# Patient Record
Sex: MALE | Race: WHITE | NOT HISPANIC OR LATINO
[De-identification: names, ages, dates, MRNs, and addresses within clinical notes are randomized per-mention and may not be internally consistent; named-entity substitution may affect disease eponyms.]

---

## 2020-07-08 PROBLEM — Z00.00 ENCOUNTER FOR PREVENTIVE HEALTH EXAMINATION: Status: ACTIVE | Noted: 2020-07-08

## 2020-07-09 ENCOUNTER — APPOINTMENT (OUTPATIENT)
Dept: OTOLARYNGOLOGY | Facility: CLINIC | Age: 25
End: 2020-07-09
Payer: COMMERCIAL

## 2020-07-09 VITALS
TEMPERATURE: 97.3 F | HEART RATE: 74 BPM | BODY MASS INDEX: 19.49 KG/M2 | SYSTOLIC BLOOD PRESSURE: 131 MMHG | DIASTOLIC BLOOD PRESSURE: 90 MMHG | WEIGHT: 117 LBS | HEIGHT: 65 IN

## 2020-07-09 DIAGNOSIS — S09.92XS UNSPECIFIED INJURY OF NOSE, SEQUELA: ICD-10-CM

## 2020-07-09 PROCEDURE — 31231 NASAL ENDOSCOPY DX: CPT

## 2020-07-09 PROCEDURE — 99204 OFFICE O/P NEW MOD 45 MIN: CPT | Mod: 25

## 2020-07-09 NOTE — ASSESSMENT
[FreeTextEntry1] : Mr. Anderson has chronic nasal congestion, anosmia and rhinorrhea x years.\par He has significant deviated nasal septum and nasal deformity, likely from prior nasal trauma.\par He has large bilateral nasal polyps emanating from the anterior and posterior sinus drainage pathways and prevent odors from getting to the nasal roof.\par He has chronic sinusitis and allergic rhinitis, seasonal and perennial.\par \par PLAN:\par - CT sinus noncontrast, scheduled for 7/14/2020\par - I recommended bilateral endoscopic sinus surgery (extent to be determined by the CT scan)with navigation, to be done at same operation for his septorhinoplasty.\par Risks, benefits and alternatives were discussed with patient and his mother.\par Risks include, but are not limited to, bleeding, infection, septal hematoma or abscess, septal perforation, intranasal scarring, difficulty breathing, cerebrospinal fluid leak, vision change. \par He is aware that surgery does not treat allergy and that polyps can return.\par Pt and mother's questions were answered, and he agreed to proceed.\par The sinonasal surgery has been scheduled for  July 22 at [facility]. \par Medical evaluation and clearance for surgery from Dr. Mendoza would be greatly appreciated.\par Preoperative oral steroids have been prescribed and will start 3 days prior to surgery.\par

## 2020-07-09 NOTE — CONSULT LETTER
[Dear  ___] : Dear  [unfilled], [( Thank you for referring [unfilled] for consultation for _____ )] : Thank you for referring [unfilled] for consultation for [unfilled] [Please see my note below.] : Please see my note below. [Consult Closing:] : Thank you very much for allowing me to participate in the care of this patient.  If you have any questions, please do not hesitate to contact me. [Sincerely,] : Sincerely, [DrJutsino  ___] : Dr. ROSE [FreeTextEntry2] : Nikunj Castillo MD\par [address] [facility], [address]\par [place] [address]\par  [FreeTextEntry3] : \par Shikha Vidal MD \par Otolaryngology, Head and Neck Surgery \par \par

## 2020-07-09 NOTE — HISTORY OF PRESENT ILLNESS
[de-identified] : Mr Anderson is a 24 year old man who was referred by Dr. Nikunj Castillo for nasal polyps.\par He was accompanied by his mother, who also contributed to history.  \par \par He is scheduled to have septorhinoplasty with Dr. Castillo on July 22 and is interested in sinus surgery also.\par Patient had a sports injury 8 years ago and broke his nose.\par Since the injury, he has nasal congestion, decreased sense of smell and rhinorrhea.  No facial pain or headache.\par Ears feel heavy and clogged.  No tinnitus or dizziness.\par Seasonal and dust allergies.   Denies asthma and ASA sensitivity.\par Previously treated another ENT with periodic prednisone, Claritin and Flonase for the symptoms with little relief.\par He denies recurrent acute sinus infections and sinus imaging.\par \par

## 2020-07-09 NOTE — PROCEDURE
[FreeTextEntry6] : \par Indication:  Anosmia\par -Verbal consent was obtained from patient prior to exam. \par - Carlos-Synephrine  spray applied to nose bilaterally.\par Nasal endoscopy was performed with flexible  scope.\par Findings: \par -- Inferior turbinates edematous and boggy  bilateral.  Inferior meatus clear bilateral.\par -- Septum was very  S-shaped.\par -- Large polyps see blocking most of nasal airway and emanating from middle meatus and SER bilateral.\par -- Mucus clear bilateral\par -- Middle and superior turbinates not really visible due to polyps.\par -- Nasopharynx without mass or exudate\par -- Adenoids were small\par -- Eustachian orifices were clear bilateral\par \par The patient tolerated the procedure well.\par

## 2020-07-09 NOTE — REVIEW OF SYSTEMS
[As Noted in HPI] : as noted in HPI [Negative] : Heme/Lymph [Patient Intake Form Reviewed] : Patient intake form was reviewed

## 2020-07-09 NOTE — CONSULT LETTER
[Dear  ___] : Dear  [unfilled], [( Thank you for referring [unfilled] for consultation for _____ )] : Thank you for referring [unfilled] for consultation for [unfilled] [Please see my note below.] : Please see my note below. [Consult Closing:] : Thank you very much for allowing me to participate in the care of this patient.  If you have any questions, please do not hesitate to contact me. [Sincerely,] : Sincerely, [DrJustino  ___] : Dr. ROSE [FreeTextEntry2] : Nikunj Castillo MD\par [address] [facility], [address]\par [place] [address]\par  [FreeTextEntry3] : \par Shikha Vidal MD \par Otolaryngology, Head and Neck Surgery \par \par

## 2020-07-09 NOTE — HISTORY OF PRESENT ILLNESS
[de-identified] : Mr Anderson is a 24 year old man who was referred by Dr. Nikunj Castillo for nasal polyps.\par He was accompanied by his mother, who also contributed to history.  \par \par He is scheduled to have septorhinoplasty with Dr. Castillo on July 22 and is interested in sinus surgery also.\par Patient had a sports injury 8 years ago and broke his nose.\par Since the injury, he has nasal congestion, decreased sense of smell and rhinorrhea.  No facial pain or headache.\par Ears feel heavy and clogged.  No tinnitus or dizziness.\par Seasonal and dust allergies.   Denies asthma and ASA sensitivity.\par Previously treated another ENT with periodic prednisone, Claritin and Flonase for the symptoms with little relief.\par He denies recurrent acute sinus infections and sinus imaging.\par \par

## 2020-07-09 NOTE — PHYSICAL EXAM
[Nasal Endoscopy Performed] : nasal endoscopy was performed, see procedure section for findings [] : septum deviated bilaterally [Midline] : trachea located in midline position [Normal] : no rashes [de-identified] : Nasal bones deviated to right. [FreeTextEntry2] : Normal except for nasal deformity. [de-identified] : Carotid pulses 2+ bilateral.

## 2020-07-09 NOTE — ASSESSMENT
[FreeTextEntry1] : Mr. Anderson has chronic nasal congestion, anosmia and rhinorrhea x years.\par He has significant deviated nasal septum and nasal deformity, likely from prior nasal trauma.\par He has large bilateral nasal polyps emanating from the anterior and posterior sinus drainage pathways and prevent odors from getting to the nasal roof.\par He has chronic sinusitis and allergic rhinitis, seasonal and perennial.\par \par PLAN:\par - CT sinus noncontrast, scheduled for 7/14/2020\par - I recommended bilateral endoscopic sinus surgery (extent to be determined by the CT scan)with navigation, to be done at same operation for his septorhinoplasty.\par Risks, benefits and alternatives were discussed with patient and his mother.\par Risks include, but are not limited to, bleeding, infection, septal hematoma or abscess, septal perforation, intranasal scarring, difficulty breathing, cerebrospinal fluid leak, vision change. \par He is aware that surgery does not treat allergy and that polyps can return.\par Pt and mother's questions were answered, and he agreed to proceed.\par The sinonasal surgery has been scheduled for  July 22 at [facility]. \par Medical evaluation and clearance for surgery from Dr. Mendoza would be greatly appreciated.\par Preoperative oral steroids have been prescribed and will start 3 days prior to surgery.\par

## 2020-07-09 NOTE — PHYSICAL EXAM
[Nasal Endoscopy Performed] : nasal endoscopy was performed, see procedure section for findings [] : septum deviated bilaterally [Midline] : trachea located in midline position [Normal] : no rashes [de-identified] : Nasal bones deviated to right. [FreeTextEntry2] : Normal except for nasal deformity. [de-identified] : Carotid pulses 2+ bilateral.

## 2020-07-21 ENCOUNTER — TRANSCRIPTION ENCOUNTER (OUTPATIENT)
Age: 25
End: 2020-07-21

## 2020-07-22 ENCOUNTER — OUTPATIENT (OUTPATIENT)
Dept: OUTPATIENT SERVICES | Facility: HOSPITAL | Age: 25
LOS: 1 days | Discharge: HOME | End: 2020-07-22
Payer: COMMERCIAL

## 2020-07-22 ENCOUNTER — TRANSCRIPTION ENCOUNTER (OUTPATIENT)
Age: 25
End: 2020-07-22

## 2020-07-22 ENCOUNTER — APPOINTMENT (OUTPATIENT)
Dept: OTOLARYNGOLOGY | Facility: HOSPITAL | Age: 25
End: 2020-07-22

## 2020-07-22 ENCOUNTER — RESULT REVIEW (OUTPATIENT)
Age: 25
End: 2020-07-22

## 2020-07-22 PROCEDURE — 31276 NSL/SINS NDSC FRNT TISS RMVL: CPT | Mod: RT

## 2020-07-22 PROCEDURE — 31288 NASAL/SINUS ENDOSCOPY SURG: CPT | Mod: 50,59

## 2020-07-22 PROCEDURE — 61782 SCAN PROC CRANIAL EXTRA: CPT

## 2020-07-22 PROCEDURE — 31267 ENDOSCOPY MAXILLARY SINUS: CPT | Mod: 50

## 2020-07-22 PROCEDURE — 88311 DECALCIFY TISSUE: CPT | Mod: 26

## 2020-07-22 PROCEDURE — 31255 NSL/SINS NDSC W/TOT ETHMDCT: CPT | Mod: 50

## 2020-07-22 PROCEDURE — 88304 TISSUE EXAM BY PATHOLOGIST: CPT | Mod: 26

## 2020-07-22 PROCEDURE — 88305 TISSUE EXAM BY PATHOLOGIST: CPT | Mod: 26

## 2020-07-29 LAB — SURGICAL PATHOLOGY STUDY: SIGNIFICANT CHANGE UP

## 2020-08-05 ENCOUNTER — APPOINTMENT (OUTPATIENT)
Dept: OTOLARYNGOLOGY | Facility: CLINIC | Age: 25
End: 2020-08-05
Payer: COMMERCIAL

## 2020-08-05 VITALS
DIASTOLIC BLOOD PRESSURE: 78 MMHG | HEIGHT: 65 IN | WEIGHT: 121 LBS | HEART RATE: 88 BPM | TEMPERATURE: 97.1 F | SYSTOLIC BLOOD PRESSURE: 133 MMHG | BODY MASS INDEX: 20.16 KG/M2

## 2020-08-05 PROCEDURE — 31231 NASAL ENDOSCOPY DX: CPT

## 2020-08-05 PROCEDURE — 99213 OFFICE O/P EST LOW 20 MIN: CPT | Mod: 25

## 2020-08-05 RX ORDER — PREDNISONE 10 MG/1
10 TABLET ORAL
Qty: 18 | Refills: 0 | Status: COMPLETED | COMMUNITY
Start: 2020-07-09 | End: 2020-08-05

## 2020-08-05 RX ORDER — ECHINACEA 200 MG
200 CAPSULE ORAL
Refills: 0 | Status: COMPLETED | COMMUNITY
End: 2020-08-05

## 2020-08-06 RX ORDER — SODIUM CHLORIDE 0.65 %
0.65 AEROSOL, SPRAY (ML) NASAL
Refills: 0 | Status: ACTIVE | COMMUNITY

## 2020-08-06 NOTE — CONSULT LETTER
[Dear  ___] : Dear  [unfilled], [Please see my note below.] : Please see my note below. [Courtesy Letter:] : I had the pleasure of seeing your patient, [unfilled], in my office today. [Consult Closing:] : Thank you very much for allowing me to participate in the care of this patient.  If you have any questions, please do not hesitate to contact me. [Sincerely,] : Sincerely, [FreeTextEntry2] : Nikunj Castillo MD\par [address] [facility], [address]\par [place] [address]\par  [FreeTextEntry3] : \par Shikha Vidal MD \par Otolaryngology, Head and Neck Surgery \par \par  [FreeTextEntry1] : \par Enclosed please find my office notes for August 5, 2020. \par \par  [DrJustino  ___] : Dr. ROSE

## 2020-08-06 NOTE — HISTORY OF PRESENT ILLNESS
[de-identified] : Mr. Anderson underwent endoscopic sinus surgery (Bilateral maxillary, total ethmoid, sphenoid; Right frontal) with navigation on July 22, 2020, at [facility].  At the same OR, he had septorhinoplasty by Dr. Castillo.\par He had nasal dorsum cast removed  last week.\par Developed rash with oral clindamycin.  Over past week, has red itchy rash on his forehead and cheeks.  To see Derm.\par \par He complains of severe nasal congestion and frequent postnasal drip (dark blobs of mucus brought up from throat).\par However, he is able to smell at times.  No headache.\par He c/o that voice sounds like he has a cold.\par Using spray can of saline BID and irrigation.\par \par \par PATHOLOGY Sinus shavings (7/22/2020):\par - Bone and inflamed respiratory mucosa with cystic dilatation of glands and edematous inflammatory polyps.\par - Some specimen also with dense eosinophilic infiltrate.\par \par

## 2020-08-06 NOTE — PHYSICAL EXAM
[Normal] : normal appearance, well groomed, well nourished, and in no acute distress [Nasal Endoscopy Performed] : nasal endoscopy was performed, see procedure section for findings [de-identified] : Nasal bones straight.  Still with mild edema nose.

## 2020-08-06 NOTE — ASSESSMENT
[FreeTextEntry1] : Mr. Anderson is healing well after sinus surgery 2 weeks ago.\par Nasal breathing is much better after sinus debridement and nasal decongestion.\par He is regaining sense of smell.\par Steroid stents remain in place bilaterally.\par \par PLAN:\par - continue saline spray and irrigations\par - Blow nose lightly\par \par Return in 1 month\par

## 2020-08-26 ENCOUNTER — APPOINTMENT (OUTPATIENT)
Dept: OTOLARYNGOLOGY | Facility: CLINIC | Age: 25
End: 2020-08-26
Payer: COMMERCIAL

## 2020-08-26 VITALS
HEIGHT: 65 IN | SYSTOLIC BLOOD PRESSURE: 135 MMHG | DIASTOLIC BLOOD PRESSURE: 93 MMHG | WEIGHT: 121 LBS | HEART RATE: 68 BPM | TEMPERATURE: 97.3 F | BODY MASS INDEX: 20.16 KG/M2

## 2020-08-26 DIAGNOSIS — Z87.09 PERSONAL HISTORY OF OTHER DISEASES OF THE RESPIRATORY SYSTEM: ICD-10-CM

## 2020-08-26 DIAGNOSIS — M95.0 ACQUIRED DEFORMITY OF NOSE: ICD-10-CM

## 2020-08-26 DIAGNOSIS — R21 RASH AND OTHER NONSPECIFIC SKIN ERUPTION: ICD-10-CM

## 2020-08-26 PROCEDURE — 99212 OFFICE O/P EST SF 10 MIN: CPT | Mod: 25

## 2020-08-26 PROCEDURE — 31237 NSL/SINS NDSC SURG BX POLYPC: CPT | Mod: 50

## 2020-08-30 PROBLEM — M95.0 NASAL DEFORMITY, ACQUIRED: Status: RESOLVED | Noted: 2020-07-09 | Resolved: 2020-08-30

## 2020-08-30 PROBLEM — R21 RASH OF FACE: Status: RESOLVED | Noted: 2020-08-06 | Resolved: 2020-08-30

## 2020-08-30 PROBLEM — Z87.09 HISTORY OF DEVIATED NASAL SEPTUM: Status: RESOLVED | Noted: 2020-07-09 | Resolved: 2020-08-30

## 2020-08-30 RX ORDER — FLUTICASONE PROPIONATE 50 UG/1
50 SPRAY, METERED NASAL DAILY
Qty: 1 | Refills: 3 | Status: ACTIVE | COMMUNITY
Start: 2020-08-30 | End: 1900-01-01

## 2020-08-30 RX ORDER — AMOXICILLIN 875 MG/1
875 TABLET, FILM COATED ORAL
Qty: 20 | Refills: 0 | Status: COMPLETED | COMMUNITY
Start: 2020-03-07

## 2020-08-30 RX ORDER — SODIUM CHLORIDE 0.65 %
0.65 DROPS NASAL
Qty: 50 | Refills: 0 | Status: COMPLETED | COMMUNITY
Start: 2020-07-15

## 2020-08-30 RX ORDER — KETOCONAZOLE 20 MG/G
2 CREAM TOPICAL
Qty: 60 | Refills: 0 | Status: COMPLETED | COMMUNITY
Start: 2020-07-13

## 2020-08-30 RX ORDER — METRONIDAZOLE 7.5 MG/G
0.75 LOTION TOPICAL
Qty: 59 | Refills: 0 | Status: COMPLETED | COMMUNITY
Start: 2020-07-13

## 2020-08-30 RX ORDER — DOXYCYCLINE HYCLATE 100 MG/1
100 CAPSULE ORAL
Qty: 7 | Refills: 0 | Status: COMPLETED | COMMUNITY
Start: 2020-07-26

## 2020-08-30 RX ORDER — BUDESONIDE 0.5 MG/2ML
0.5 INHALANT ORAL
Qty: 120 | Refills: 0 | Status: COMPLETED | COMMUNITY
Start: 2020-06-16

## 2020-08-30 RX ORDER — OXYCODONE AND ACETAMINOPHEN 5; 325 MG/1; MG/1
5-325 TABLET ORAL
Qty: 15 | Refills: 0 | Status: COMPLETED | COMMUNITY
Start: 2020-07-15

## 2020-08-30 RX ORDER — CEFADROXIL 500 MG/1
500 CAPSULE ORAL
Qty: 20 | Refills: 0 | Status: COMPLETED | COMMUNITY
Start: 2020-07-13

## 2020-08-30 RX ORDER — METHYLPREDNISOLONE 4 MG/1
4 TABLET ORAL
Qty: 21 | Refills: 0 | Status: COMPLETED | COMMUNITY
Start: 2020-07-15

## 2020-08-30 RX ORDER — FLUOCINONIDE 0.5 MG/ML
0.05 SOLUTION TOPICAL
Qty: 60 | Refills: 0 | Status: COMPLETED | COMMUNITY
Start: 2020-07-13

## 2020-08-30 RX ORDER — MONTELUKAST 10 MG/1
10 TABLET, FILM COATED ORAL
Qty: 30 | Refills: 0 | Status: COMPLETED | COMMUNITY
Start: 2019-10-10

## 2020-08-30 RX ORDER — KETOCONAZOLE 20.5 MG/ML
2 SHAMPOO, SUSPENSION TOPICAL
Qty: 120 | Refills: 0 | Status: COMPLETED | COMMUNITY
Start: 2020-07-13

## 2020-08-30 RX ORDER — CLINDAMYCIN HYDROCHLORIDE 300 MG/1
300 CAPSULE ORAL
Qty: 28 | Refills: 0 | Status: COMPLETED | COMMUNITY
Start: 2020-07-15

## 2020-08-30 RX ORDER — ONDANSETRON 8 MG/1
8 TABLET, ORALLY DISINTEGRATING ORAL
Qty: 2 | Refills: 0 | Status: COMPLETED | COMMUNITY
Start: 2020-07-15

## 2020-08-30 NOTE — HISTORY OF PRESENT ILLNESS
[de-identified] : Mr. Anderson is s/p endoscopic sinus surgery (Bilateral maxillary, total ethmoid, sphenoid; Right frontal) with navigation on July 22, 2020, at the same OR when he had septorhinoplasty by Dr. Castillo.\par \par He reports that nasal breathing is very good.  He has infrequent postnasal drip.\par Sometimes blows crusts out of his nose.  Feels crusting in his nostrils that doesn’t come out.\par Able to smell well now.  No headache.\par Voice back to normal.\par Still using aerosol spray can of saline BID and irrigation.\par \par \par PATHOLOGY Sinus shavings (7/22/2020):\par - Bone and inflamed respiratory mucosa with cystic dilatation of glands and edematous inflammatory polyps.\par - Some specimen also with dense eosinophilic infiltrate.\par \par

## 2020-08-30 NOTE — PROCEDURE
[FreeTextEntry6] : with DEBRIDEMENT\par Indication: sinus surgery\par -Verbal consent was obtained from patient prior to exam. \par - Carlos-Synephrine and lidocaine 2% spray applied to nose bilaterally.\par Nasal endoscopy was performed with 0-degree  rigid  scope.\par Findings: \par -- Inferior turbinates healing\par -- Septum is intact and with less edematous.\par -- No polyps visible either side nose\par -- LEFT:  Propel stent in place; partially removed.  Minimal crusting in ethmoid, removed. Maxillary antrostomy, ethmoid cavity and sphenoidotomy patent\par -- RIGHT:  Propel stent dissolved from middle meatus.  Remnant mini Propel seen in right frontal outflow tract.  Maxillary sinus antrostomy with crusting, removed with suction.  Ethmoid cavity and sphenoidotomy patent.\par \par The patient tolerated the procedure well. \par

## 2020-08-30 NOTE — CONSULT LETTER
[Dear  ___] : Dear  [unfilled], [Courtesy Letter:] : I had the pleasure of seeing your patient, [unfilled], in my office today. [Please see my note below.] : Please see my note below. [Consult Closing:] : Thank you very much for allowing me to participate in the care of this patient.  If you have any questions, please do not hesitate to contact me. [Sincerely,] : Sincerely, [DrJustino  ___] : Dr. ROSE [FreeTextEntry1] : \par \par  [FreeTextEntry2] : Nikunj Castillo MD\par [address] [facility], [address]\par [place] [address]\par  [FreeTextEntry3] : \par Shikha Vidal MD \par Otolaryngology, Head and Neck Surgery \par \par

## 2020-08-30 NOTE — ASSESSMENT
[FreeTextEntry1] : Mr. Anderson is healing well after sinus surgery 1 month ago.\par Nasal breathing is much better.  Sense of smell has returned.\par Steroid stent remnants bilaterally.\par No polyps seen on exam today.\par \par PLAN:\par - continue saline spray and irrigations\par - fluticasone nasal spray to start next week\par \par Return in December\par \par

## 2020-12-04 ENCOUNTER — APPOINTMENT (OUTPATIENT)
Dept: OTOLARYNGOLOGY | Facility: CLINIC | Age: 25
End: 2020-12-04

## 2021-05-07 ENCOUNTER — APPOINTMENT (OUTPATIENT)
Dept: OTOLARYNGOLOGY | Facility: CLINIC | Age: 26
End: 2021-05-07

## 2021-05-18 ENCOUNTER — APPOINTMENT (OUTPATIENT)
Dept: OTOLARYNGOLOGY | Facility: CLINIC | Age: 26
End: 2021-05-18
Payer: MEDICAID

## 2021-05-18 VITALS
WEIGHT: 125 LBS | SYSTOLIC BLOOD PRESSURE: 128 MMHG | BODY MASS INDEX: 20.83 KG/M2 | TEMPERATURE: 97.2 F | DIASTOLIC BLOOD PRESSURE: 86 MMHG | HEART RATE: 65 BPM | HEIGHT: 65 IN

## 2021-05-18 DIAGNOSIS — H69.83 OTHER SPECIFIED DISORDERS OF EUSTACHIAN TUBE, BILATERAL: ICD-10-CM

## 2021-05-18 DIAGNOSIS — J33.9 NASAL POLYP, UNSPECIFIED: ICD-10-CM

## 2021-05-18 DIAGNOSIS — J32.9 CHRONIC SINUSITIS, UNSPECIFIED: ICD-10-CM

## 2021-05-18 DIAGNOSIS — J30.9 ALLERGIC RHINITIS, UNSPECIFIED: ICD-10-CM

## 2021-05-18 DIAGNOSIS — R43.0 ANOSMIA: ICD-10-CM

## 2021-05-18 PROCEDURE — 99213 OFFICE O/P EST LOW 20 MIN: CPT | Mod: 25

## 2021-05-18 PROCEDURE — 31231 NASAL ENDOSCOPY DX: CPT

## 2021-05-18 PROCEDURE — 99072 ADDL SUPL MATRL&STAF TM PHE: CPT

## 2021-05-18 RX ORDER — PREDNISONE 10 MG/1
10 TABLET ORAL
Qty: 21 | Refills: 0 | Status: ACTIVE | COMMUNITY
Start: 2021-05-18 | End: 1900-01-01

## 2021-05-18 NOTE — CONSULT LETTER
[Dear  ___] : Dear  [unfilled], [Courtesy Letter:] : I had the pleasure of seeing your patient, [unfilled], in my office today. [Please see my note below.] : Please see my note below. [Consult Closing:] : Thank you very much for allowing me to participate in the care of this patient.  If you have any questions, please do not hesitate to contact me. [Sincerely,] : Sincerely, [DrJustino  ___] : Dr. ROSE [FreeTextEntry2] : Nikunj Castillo MD\par [address] [facility], [address]\par [place] [address]\par  [FreeTextEntry1] : \par \par  [FreeTextEntry3] : \par Shikha Vidal MD \par Otolaryngology, Head and Neck Surgery \par \par

## 2021-05-18 NOTE — ASSESSMENT
[FreeTextEntry1] : Mr. Anderson is symptomatically fine after sinus surgery and septoplasty in July 2020.\par He has small polyps partially blocking the right maxillary , ethmoid and frontal sinus openings today.\par Nasal breathing is fine.\par \par PLAN:\par - continue fluticasone nasal spray but increase to BID\par - prednisone taper to shrink the polyps\par \par Return in 1 month\par \par

## 2021-05-18 NOTE — HISTORY OF PRESENT ILLNESS
[de-identified] : Mr. Anderson is s/p endoscopic sinus surgery (Bilateral maxillary, total ethmoid, sphenoid; Right frontal) with navigation on July 22, 2020, at the same OR when he had septorhinoplasty by Dr. Castillo.\par He has not returned for followup with me since Aug 18, 2020.\par \par He reports that nasal breathing is good.  He has occasional postnasal drip.\par Able to smell well.  No facial pain/pressure or headache.\par Using saline and fluticasone nasal sprays sometimes.\par Allergies controlled with Claritin daily.\par \par \par PATHOLOGY Sinus shavings (7/22/2020):\par - Bone and inflamed respiratory mucosa with cystic dilatation of glands and edematous inflammatory polyps.\par - Some specimen also with dense eosinophilic infiltrate.\par \par

## 2021-05-18 NOTE — PROCEDURE
[FreeTextEntry6] : \par Indication: nasal polyps\par -Verbal consent was obtained from patient prior to exam. \par Nasal endoscopy was performed with 0-degree  rigid  scope.\par Findings: \par -- Inferior turbinates normal size\par -- Septum is intact and fairly midline\par -- Middle turbinates were partially resected.\par -- LEFT:  Patent ethmoid cavity, frontal sinusotomy, maxillary antrostomy, and sphenoidotomy.  No polyps or drainage seen\par -- RIGHT:  Small polyps seen blocking upper half of maxillary antrostomy and in upper ethmoid cavity.  I cannot see frontal outflow tract due to polyp.  Sphenoidotomy is patent.\par \par The patient tolerated the procedure well. \par

## 2021-06-18 ENCOUNTER — APPOINTMENT (OUTPATIENT)
Dept: OTOLARYNGOLOGY | Facility: CLINIC | Age: 26
End: 2021-06-18